# Patient Record
Sex: FEMALE | Race: WHITE | URBAN - METROPOLITAN AREA
[De-identification: names, ages, dates, MRNs, and addresses within clinical notes are randomized per-mention and may not be internally consistent; named-entity substitution may affect disease eponyms.]

---

## 2018-06-05 ENCOUNTER — OFFICE VISIT (OUTPATIENT)
Dept: URBAN - METROPOLITAN AREA CLINIC 40 | Facility: CLINIC | Age: 68
End: 2018-06-05
Payer: MEDICARE

## 2018-06-05 PROCEDURE — 99204 OFFICE O/P NEW MOD 45 MIN: CPT | Performed by: OPHTHALMOLOGY

## 2018-06-05 ASSESSMENT — VISUAL ACUITY
OS: CF 1FT
OD: 20/25

## 2018-06-05 ASSESSMENT — KERATOMETRY
OD: 42.75
OS: 43.50

## 2018-06-05 ASSESSMENT — INTRAOCULAR PRESSURE
OD: 21
OS: 21

## 2018-06-05 NOTE — IMPRESSION/PLAN
Impression: Combined forms of age-related cataract, bilateral: H25.813.  Plan: needs retinal eval first

## 2018-06-05 NOTE — IMPRESSION/PLAN
Impression: Type 2 diab w moderate nonprlf diab rtnop w macular edema, bilateral: O60.0216. Plan: Diabetes type II: moderate background diabetic retinopathy, no signs of neovascularization noted. Will refer patient for a retinal consult to determine if treatment is necessary. Discussed ocular and systemic benefits of maintaining blood sugar control.

## 2018-06-12 ENCOUNTER — OFFICE VISIT (OUTPATIENT)
Dept: URBAN - METROPOLITAN AREA CLINIC 33 | Facility: CLINIC | Age: 68
End: 2018-06-12
Payer: MEDICARE

## 2018-06-12 DIAGNOSIS — H25.813 COMBINED FORMS OF AGE-RELATED CATARACT, BILATERAL: ICD-10-CM

## 2018-06-12 PROCEDURE — 92134 CPTRZ OPH DX IMG PST SGM RTA: CPT | Performed by: OPHTHALMOLOGY

## 2018-06-12 PROCEDURE — 99213 OFFICE O/P EST LOW 20 MIN: CPT | Performed by: OPHTHALMOLOGY

## 2018-06-12 PROCEDURE — 67028 INJECTION EYE DRUG: CPT | Performed by: OPHTHALMOLOGY

## 2018-06-12 ASSESSMENT — INTRAOCULAR PRESSURE
OD: 17
OS: 17

## 2018-06-12 NOTE — IMPRESSION/PLAN
Impression: Type 2 diab w moderate nonprlf diab rtnop w macular edema, bilateral: N24.3164. Recommend FA after series of injection to determine if laser canidate  Plan: OCT ordered and performed today. Discussed diagnosis in detail with patient. Discussed treatment options with patient. Discussed risks and benefits and patient understands. Recommend Avastin OU,  A 3 month series of  Intirivitreal injection's, 1 EVERY MONTH  #1 today then #2 in 1 month, #3 in 2 month's, Then perform DE/OCT  W/ FA in 3 month's.

## 2018-06-12 NOTE — IMPRESSION/PLAN
Impression: Combined forms of age-related cataract, bilateral: H25.813. Plan: No retinal contraindication to CE IOL. Proceed  as lucius BRIDGES/ Dr Buddy Barreto

## 2018-07-25 ENCOUNTER — OFFICE VISIT (OUTPATIENT)
Dept: URBAN - METROPOLITAN AREA CLINIC 23 | Facility: CLINIC | Age: 68
End: 2018-07-25
Payer: MEDICARE

## 2018-07-25 DIAGNOSIS — E11.3292 TYPE 2 DIAB W MILD NONPRLF DIABETIC RTNOP W/O MACULAR EDEMA, LEFT EYE: Primary | ICD-10-CM

## 2018-07-25 PROCEDURE — 92134 CPTRZ OPH DX IMG PST SGM RTA: CPT | Performed by: OPHTHALMOLOGY

## 2018-07-25 PROCEDURE — 92014 COMPRE OPH EXAM EST PT 1/>: CPT | Performed by: OPHTHALMOLOGY

## 2018-07-25 ASSESSMENT — INTRAOCULAR PRESSURE
OS: 14
OD: 15

## 2018-07-25 NOTE — IMPRESSION/PLAN
Impression: Type 2 diab w mild nonprlf diabetic rtnop w macular edema, right eye: F65.0915. OD. Condition: improving.
s/p AV OD #1  06/12/2018 w/Dr José. Plan: Discussed diagnosis in detail with patient. No treatment is required at this time based on exam and OCT. Recommend observation for now. Will reassess condition in 6 wks. OCT shows minimal edema OD.

## 2018-07-25 NOTE — IMPRESSION/PLAN
Impression: Age-related nuclear cataract, bilateral: H25.13. OU. Condition: stable. Plan: Discussed diagnosis in detail with patient. No treatment is required at this time. Will continue to observe condition and or symptoms. Recommend to hold off on a Cataract evaluation until the retina is stable OU.

## 2018-07-25 NOTE — IMPRESSION/PLAN
Impression: Type 2 diab w mild nonprlf diabetic rtnop w/o macular edema, left eye: E50.2369. OS. Condition: unstable but improving. Vision: vision affected. s/p AV OS #1  06/12/2018 w/Dr José Plan: Discussed diagnosis in detail with patient. Discussed risks of progression with present condition. Based on findings recommend Intravitreal Injection Treatment to help reduce the swelling / fluid and prevent a further reduction in vision. Discussed the risks and benefits of tx. All questions answered. Patient elects to proceed with recommendation.  OCT moderate edema w/ERM OS

## 2018-07-25 NOTE — IMPRESSION/PLAN
Impression: Puckering of macula, left eye: H35.372. OS. Condition: stable. Plan: Discussed diagnosis in detail with patient. No treatment is required at this time. Will continue to observe condition and or symptoms. OCT OS shows moderate edema w/ERM OS.

## 2018-08-03 ENCOUNTER — PROCEDURE (OUTPATIENT)
Dept: URBAN - METROPOLITAN AREA CLINIC 23 | Facility: CLINIC | Age: 68
End: 2018-08-03
Payer: MEDICARE

## 2018-08-03 PROCEDURE — 67028 INJECTION EYE DRUG: CPT | Performed by: OPHTHALMOLOGY

## 2018-09-28 ENCOUNTER — OFFICE VISIT (OUTPATIENT)
Dept: URBAN - METROPOLITAN AREA CLINIC 23 | Facility: CLINIC | Age: 68
End: 2018-09-28
Payer: MEDICARE

## 2018-09-28 DIAGNOSIS — H43.813 VITREOUS DEGENERATION, BILATERAL: ICD-10-CM

## 2018-09-28 DIAGNOSIS — E11.3313 TYPE 2 DIAB WITH MODERATE NONP RTNOP WITH MACULAR EDEMA, BI: ICD-10-CM

## 2018-09-28 PROCEDURE — 99213 OFFICE O/P EST LOW 20 MIN: CPT | Performed by: OPHTHALMOLOGY

## 2018-09-28 PROCEDURE — 67028 INJECTION EYE DRUG: CPT | Performed by: OPHTHALMOLOGY

## 2018-09-28 PROCEDURE — 92134 CPTRZ OPH DX IMG PST SGM RTA: CPT | Performed by: OPHTHALMOLOGY

## 2018-09-28 ASSESSMENT — INTRAOCULAR PRESSURE
OS: 18
OD: 19

## 2018-09-28 NOTE — IMPRESSION/PLAN
Impression: Type 2 diab w moderate nonprlf diab rtnop w macular edema, left eye: J91.3421. OS. Condition: improving. Vision: affetced. s/p AV OS #2  08/03/2018 w/Dr Mccormick
s/p AV OS #1  06/12/2018 w/Dr José Plan: Discussed diagnosis in detail with patient. Discussed risks of progression. Based on today's exam, diagnostic studies and review of records, recommend to continue with HUGH tx to help reduce the swelling in order to prevent a further reduction in vision. An examination that was significantly and separately identifiable from the procedure was performed today. Discussed the risks and benefits of tx. Patient elects to proceed with recommendation. OCT shows a decrease in edema w/ERM OS.

## 2018-09-28 NOTE — IMPRESSION/PLAN
Impression: Type 2 diab w mild nonprlf diabetic rtnop w macular edema, right eye: F25.6077. OD. Condition: stable. s/p AV OD #1  06/12/2018 w/Dr José. Plan: Discussed diagnosis in detail with patient. No treatment is required at this time based on exam and OCT. Recommend observation for now. Will reassess condition in 6 wks. OCT shows minimal edema OD.

## 2018-09-28 NOTE — IMPRESSION/PLAN
Impression: Puckering of macula, left eye: H35.372. OS. Condition: stable. Plan: Discussed diagnosis in detail with patient. No treatment is required at this time. Will continue to observe condition and or symptoms. OCT OS shows edema w/ERM OS.

## 2018-11-09 ENCOUNTER — OFFICE VISIT (OUTPATIENT)
Dept: URBAN - METROPOLITAN AREA CLINIC 23 | Facility: CLINIC | Age: 68
End: 2018-11-09
Payer: MEDICARE

## 2018-11-09 DIAGNOSIS — E11.3312 TYPE 2 DIAB W MODERATE NONPRLF DIAB RTNOP W MACULAR EDEMA, LEFT EYE: Primary | ICD-10-CM

## 2018-11-09 PROCEDURE — 92134 CPTRZ OPH DX IMG PST SGM RTA: CPT | Performed by: OPHTHALMOLOGY

## 2018-11-09 PROCEDURE — 99213 OFFICE O/P EST LOW 20 MIN: CPT | Performed by: OPHTHALMOLOGY

## 2018-11-09 ASSESSMENT — INTRAOCULAR PRESSURE
OD: 16
OS: 16

## 2018-11-09 NOTE — IMPRESSION/PLAN
Impression: Type 2 diab w mild nonprlf diabetic rtnop w macular edema, right eye: F97.4109. OD. Condition: stable. s/p AV OD #2  09/28/2018 w/Dr Kimani Fox AV OD #1  06/12/2018 w/Dr José. Plan: Discussed diagnosis in detail with patient. Discussed risks of progression. Based on today's exam, diagnostic studies and review of records, recommend to continue with HUGH tx to help reduce the edema in order to prevent a further reduction in vision. An examination that was significantly and separately identifiable from the procedure was performed today. Discussed the risks and benefits of tx. Patient elects to proceed with recommendation. OCT shows mild edema decreasing OD.

## 2018-11-09 NOTE — IMPRESSION/PLAN
Impression: Type 2 diab w moderate nonprlf diab rtnop w macular edema, left eye: Y39.7708. OS. Condition: improving. Vision: affetced. AV OS #3  09/28/2018, AV OS #2  08/03/2018 w/Dr Mccormick
s/p AV OS #1  06/12/2018 w/Dr José Plan: Discussed diagnosis in detail with patient. Discussed risks of progression. Based on today's exam, diagnostic studies and review of records, recommend to continue with HUGH tx to help reduce the swelling in order to prevent a further reduction in vision. An examination that was significantly and separately identifiable from the procedure was performed today. Discussed the risks and benefits of tx. Patient elects to proceed with recommendation. OCT shows a decrease in edema w/ERM OS.

## 2018-12-07 ENCOUNTER — OFFICE VISIT (OUTPATIENT)
Dept: URBAN - METROPOLITAN AREA CLINIC 23 | Facility: CLINIC | Age: 68
End: 2018-12-07
Payer: MEDICARE

## 2018-12-07 PROCEDURE — 92134 CPTRZ OPH DX IMG PST SGM RTA: CPT | Performed by: OPHTHALMOLOGY

## 2018-12-07 PROCEDURE — 99213 OFFICE O/P EST LOW 20 MIN: CPT | Performed by: OPHTHALMOLOGY

## 2018-12-07 ASSESSMENT — INTRAOCULAR PRESSURE
OD: 16
OS: 17

## 2018-12-07 NOTE — IMPRESSION/PLAN
Impression: Type 2 diab w mild nonprlf diabetic rtnop w macular edema, right eye: V82.3251. OD. Condition: unstable AV OD 11/9/18 w/Dr Maine Mitchell AV OD #1  06/12/2018 w/Dr José. Plan: Discussed diagnosis in detail with patient. Discussed risks of progression. Based on today's exam, diagnostic studies and review of records, recommend to continue with HUGH tx to help reduce the edema in order to prevent a further reduction in vision. An examination that was significantly and separately identifiable from the procedure was performed today. Discussed the risks and benefits of tx. Patient elects to proceed with recommendation. OCT shows mild edema decreasing OD.

## 2018-12-07 NOTE — IMPRESSION/PLAN
Impression: Type 2 diab w moderate nonprlf diab rtnop w macular edema, left eye: C99.3227. OS. Condition: improving. Vision: affetced. AV OS 11/9/18  w/Dr Mccormick
s/p AV OS #1  06/12/2018 w/Dr José Plan: Discussed diagnosis in detail with patient. Discussed risks of progression. Based on today's exam, diagnostic studies and review of records, recommend to continue with HUGH tx to help reduce the swelling in order to prevent a further reduction in vision. An examination that was significantly and separately identifiable from the procedure was performed today. Discussed the risks and benefits of tx. Patient elects to proceed with recommendation. OCT shows a decrease in edema w/ERM OS.

## 2019-01-18 ENCOUNTER — OFFICE VISIT (OUTPATIENT)
Dept: URBAN - METROPOLITAN AREA CLINIC 23 | Facility: CLINIC | Age: 69
End: 2019-01-18
Payer: MEDICARE

## 2019-01-18 PROCEDURE — 92134 CPTRZ OPH DX IMG PST SGM RTA: CPT | Performed by: OPHTHALMOLOGY

## 2019-01-18 PROCEDURE — 92014 COMPRE OPH EXAM EST PT 1/>: CPT | Performed by: OPHTHALMOLOGY

## 2019-01-18 ASSESSMENT — INTRAOCULAR PRESSURE
OD: 15
OS: 15

## 2019-01-18 NOTE — IMPRESSION/PLAN
Impression: Type 2 diab w moderate nonprlf diab rtnop w macular edema, left eye: P82.2562. OS. Condition: improving. Vision: affetced. AV OS 12/07/2018, AV OS 11/9/18  w/Dr Mccormick
s/p AV OS #1  06/12/2018 w/Dr José Plan: Discussed diagnosis in detail with patient. Discussed risks of progression. Based on today's exam, diagnostic studies and review of records, recommend to continue with HUGH tx with KENALOG to help reduce the swelling in order to prevent a further reduction in vision. An examination that was significantly and separately identifiable from the procedure was performed today. Discussed the risks and benefits of tx. Patient elects to proceed with recommendation. OCT shows increase in edema w/ERM OS.

## 2019-01-18 NOTE — IMPRESSION/PLAN
Impression: Type 2 diab w mild nonprlf diabetic rtnop w macular edema, right eye: T68.7898. OD. Condition: unstable AV OD 12/07/18, AV OD 11/9/18 w/Dr Jose Barnes AV OD #1  06/12/2018 w/Dr José. Plan: Discussed diagnosis in detail with patient. Discussed risks of progression. Based on today's exam, diagnostic studies and review of records, recommend to continue with HUGH tx to help reduce the edema in order to prevent a further reduction in vision. An examination that was significantly and separately identifiable from the procedure was performed today. Discussed the risks and benefits of tx. Patient elects to proceed with recommendation. OCT shows increase edema OD.

## 2019-01-22 ENCOUNTER — OFFICE VISIT (OUTPATIENT)
Dept: URBAN - METROPOLITAN AREA CLINIC 23 | Facility: CLINIC | Age: 69
End: 2019-01-22
Payer: MEDICARE

## 2019-01-22 PROCEDURE — 92012 INTRM OPH EXAM EST PATIENT: CPT | Performed by: OPTOMETRIST

## 2019-01-22 PROCEDURE — 92134 CPTRZ OPH DX IMG PST SGM RTA: CPT | Performed by: OPTOMETRIST

## 2019-01-22 ASSESSMENT — INTRAOCULAR PRESSURE
OD: 15
OS: 16

## 2019-01-22 NOTE — IMPRESSION/PLAN
Impression: Type 2 diab w mild nonprlf diabetic rtnop w macular edema, right eye: C14.1044 OD. Plan: Discussed diagnosis in detail with patient. No treatment is required at this time. Reassured patient of current condition and treatment. Will continue to observe condition and or symptoms.

## 2019-03-01 ENCOUNTER — OFFICE VISIT (OUTPATIENT)
Dept: URBAN - METROPOLITAN AREA CLINIC 23 | Facility: CLINIC | Age: 69
End: 2019-03-01
Payer: MEDICARE

## 2019-03-01 PROCEDURE — 99213 OFFICE O/P EST LOW 20 MIN: CPT | Performed by: OPHTHALMOLOGY

## 2019-03-01 PROCEDURE — 92134 CPTRZ OPH DX IMG PST SGM RTA: CPT | Performed by: OPHTHALMOLOGY

## 2019-03-01 ASSESSMENT — INTRAOCULAR PRESSURE
OD: 15
OS: 15

## 2019-03-01 NOTE — IMPRESSION/PLAN
Impression: Type 2 diab w moderate nonprlf diab rtnop w macular edema, left eye: U64.1845. OS. Condition: improving. Vision: affected. Julian OS 01/18/2019, AV OS 12/07/2018, AV OS 11/9/18  w/Dr Mccormick
s/p AV OS #1  06/12/2018 w/Dr José Plan: Discussed diagnosis in detail with patient. Exam OS shows a decrease in edema post KENALOG inj tx. No treatment is required at this time based on exam and OCT. Recommend observation for now. Will reassess condition in 6 - 8 wks. OCT shows a decrease in CMT OS.

## 2019-03-01 NOTE — IMPRESSION/PLAN
Impression: Type 2 diab w mild nonprlf diabetic rtnop w macular edema, right eye: T73.4672. OD. Condition: improving. AV OD 01/18/2019, 12/07/18, AV OD 11/9/18 w/Dr Lin Schumacher AV OD #1  06/12/2018 w/Dr José. Plan: Discussed diagnosis in detail with patient. No treatment is required at this time based on exam and OCT. Recommend observation for now. Will reassess condition in 6 - 8 wks. OCT shows a decrease in CMT OD.

## 2019-04-19 ENCOUNTER — OFFICE VISIT (OUTPATIENT)
Dept: URBAN - METROPOLITAN AREA CLINIC 23 | Facility: CLINIC | Age: 69
End: 2019-04-19
Payer: MEDICARE

## 2019-04-19 PROCEDURE — 92014 COMPRE OPH EXAM EST PT 1/>: CPT | Performed by: OPHTHALMOLOGY

## 2019-04-19 PROCEDURE — 92134 CPTRZ OPH DX IMG PST SGM RTA: CPT | Performed by: OPHTHALMOLOGY

## 2019-04-19 ASSESSMENT — INTRAOCULAR PRESSURE
OS: 16
OD: 15

## 2019-04-19 NOTE — IMPRESSION/PLAN
Impression: Type 2 diab w mild nonprlf diabetic rtnop w macular edema, right eye: E36.9074. OD. Condition: worsening. Vision: affected. AV OD 01/18/2019, 12/07/18, AV OD 11/9/18 w/Dr Maisha Bhatt AV OD #1  06/12/2018 w/Dr José. Plan: Discussed diagnosis in detail with patient. Discussed risks of progression with present condition. Based on findings recommend Intravitreal Injection Treatment with AVASTIN and KENALOG in the right eye to help reduce the swelling and prevent a further reduction in vision. Discussed the risks and benefits of tx. All questions answered. Patient elects to proceed with recommendation. OCT shows an increase in CMT - active edema OD.

## 2019-04-19 NOTE — IMPRESSION/PLAN
Impression: Type 2 diab w moderate nonprlf diab rtnop w macular edema, left eye: S82.9856. OS. Condition: worsening. Vision: affected. Julian OS 01/18/2019, AV OS 12/07/2018, AV OS 11/9/18  w/Dr Mccormick
s/p AV OS #1  06/12/2018 w/Dr José Plan: Discussed diagnosis in detail with patient. Discussed risks of progression with present condition. Based on findings recommend Intravitreal Injection Treatment with AVASTIN and KENALOG in the LEFT EYE to help reduce the swelling and prevent a further reduction in vision. Discussed the risks and benefits of tx. All questions answered. Patient elects to proceed with recommendation. OCT shows an increase in CMT - active edema OS.

## 2019-04-26 ENCOUNTER — PROCEDURE (OUTPATIENT)
Dept: URBAN - METROPOLITAN AREA CLINIC 23 | Facility: CLINIC | Age: 69
End: 2019-04-26
Payer: MEDICARE

## 2019-06-07 ENCOUNTER — OFFICE VISIT (OUTPATIENT)
Dept: URBAN - METROPOLITAN AREA CLINIC 23 | Facility: CLINIC | Age: 69
End: 2019-06-07
Payer: MEDICARE

## 2019-06-07 PROCEDURE — 92134 CPTRZ OPH DX IMG PST SGM RTA: CPT | Performed by: OPHTHALMOLOGY

## 2019-06-07 PROCEDURE — 99213 OFFICE O/P EST LOW 20 MIN: CPT | Performed by: OPHTHALMOLOGY

## 2019-06-07 ASSESSMENT — INTRAOCULAR PRESSURE
OD: 17
OS: 16

## 2019-06-07 NOTE — IMPRESSION/PLAN
Impression: Type 2 diab w moderate nonprlf diab rtnop w macular edema, left eye: C64.5095. OS. Condition: improved Vision: affected. s/p AV & ABILIO OS 04/26/2019, Abilio OS 01/18/2019, AV OS 12/07/2018, AV OS 11/9/18  w/Dr Mccormick
s/p AV OS #1  06/12/2018 w/Dr José Plan: Discussed diagnosis in detail with patient. No treatment is required at this time based on exam and OCT. Recommend observation for now. Will reassess condition in 6 wks.  OCT shows Marked decrease in edema OS

## 2019-06-07 NOTE — IMPRESSION/PLAN
Impression: Type 2 diab w mild nonprlf diabetic rtnop w macular edema, right eye: S12.5151. OD. Condition: improving. Vision: affected. s/p AV  & ABILIO OD 4/26/19, AV OD 01/18/2019, 12/07/18, AV OD 11/9/18 w/Dr Derian German AV OD #1  06/12/2018 w/Dr José. Plan: Discussed diagnosis in detail with patient. No treatment is required at this time based on exam and OCT. Recommend observation for now. Will reassess condition in 6 wks.  OCT shows marked decrease in edema OD

## 2019-07-18 ENCOUNTER — OFFICE VISIT (OUTPATIENT)
Dept: URBAN - METROPOLITAN AREA CLINIC 23 | Facility: CLINIC | Age: 69
End: 2019-07-18
Payer: MEDICARE

## 2019-07-18 PROCEDURE — 92134 CPTRZ OPH DX IMG PST SGM RTA: CPT | Performed by: OPHTHALMOLOGY

## 2019-07-18 PROCEDURE — 99213 OFFICE O/P EST LOW 20 MIN: CPT | Performed by: OPHTHALMOLOGY

## 2019-07-18 ASSESSMENT — INTRAOCULAR PRESSURE
OS: 20
OD: 21

## 2019-07-18 NOTE — IMPRESSION/PLAN
Impression: Type 2 diab w mild nonprlf diabetic rtnop w macular edema, right eye: S44.9928. OD. Condition: stable. Vision: affected. s/p AV  & ABILIO OD 4/26/19, AV OD 01/18/2019, 12/07/18, AV OD 11/9/18 w/Dr Linsey Corbett AV OD #1  06/12/2018 w/Dr José. Plan: Discussed diagnosis in detail with patient. No treatment is required at this time based on exam and OCT. Recommend observation for now. Will reassess condition in 6 - 8 wks. OCT shows a decrease in CMT with no active edema OD. Philip Blend for z-pack, office visit if not better

## 2019-07-18 NOTE — IMPRESSION/PLAN
Impression: Type 2 diab w moderate nonprlf diab rtnop w macular edema, left eye: Z26.1705. OS. Condition: worsening. Vision: affected. s/p AV & ABILIO OS 04/26/2019, Abilio OS 01/18/2019, AV OS 12/07/2018, AV OS 11/9/18  w/Dr Mccormick
s/p AV OS #1  06/12/2018 w/Dr José Plan: Discussed diagnosis in detail with patient. Discussed risks of progression. Based on today's exam, diagnostic studies and review of records, recommend to continue with HUGH tx with AVASTIN & KENALOG in order to help reduce the swelling and prevent a further reduction in vision. Discussed the risks and benefits of tx. Patient elects to proceed with recommendation. OCT shows an increase in edema OS with ERM. Patient may need additional HUHG tx or laser treatment in the future.

## 2019-08-02 ENCOUNTER — PROCEDURE (OUTPATIENT)
Dept: URBAN - METROPOLITAN AREA CLINIC 23 | Facility: CLINIC | Age: 69
End: 2019-08-02
Payer: MEDICARE

## 2019-08-02 PROCEDURE — 67028 INJECTION EYE DRUG: CPT | Performed by: OPHTHALMOLOGY

## 2019-10-10 ENCOUNTER — OFFICE VISIT (OUTPATIENT)
Dept: URBAN - METROPOLITAN AREA CLINIC 23 | Facility: CLINIC | Age: 69
End: 2019-10-10
Payer: MEDICARE

## 2019-10-10 DIAGNOSIS — E11.3211 TYPE 2 DIAB W MILD NONPRLF DIABETIC RTNOP W MACULAR EDEMA, RIGHT EYE: Primary | ICD-10-CM

## 2019-10-10 PROCEDURE — 99213 OFFICE O/P EST LOW 20 MIN: CPT | Performed by: OPHTHALMOLOGY

## 2019-10-10 PROCEDURE — 92134 CPTRZ OPH DX IMG PST SGM RTA: CPT | Performed by: OPHTHALMOLOGY

## 2019-10-10 ASSESSMENT — INTRAOCULAR PRESSURE
OD: 20
OS: 22

## 2019-10-10 NOTE — IMPRESSION/PLAN
Impression: Type 2 diab w moderate nonprlf diab rtnop w macular edema, left eye: T42.5831. OS. Condition: stable OS. Vision: affected. s/p AV / Millicent Duane OS 08/02/2019, AV & ABILIO OS 04/26/2019, Abilio OS 01/18/2019, AV OS 12/07/2018, AV OS 11/9/18  w/Dr Mccormick
s/p AV OS #1  06/12/2018 w/Dr José Plan: Discussed diagnosis in detail with patient. No treatment is required at this time based on exam and OCT. Recommend observation for now. Will reassess condition in 6 wks. OCT shows a decrease in edema OS.

## 2019-10-23 NOTE — IMPRESSION/PLAN
Impression: Type 2 diab w mild nonprlf diabetic rtnop w macular edema, right eye: Z33.7648. OD. Condition: unstable. Vision: affected. s/p AV & ABILIO OD 4/26/19, AV OD 01/18/2019, 12/07/18, AV OD 11/9/18 w/Dr Kerline Dinh AV OD #1  06/12/2018 w/Dr José. Plan: Discussed diagnosis in detail with patient. Discussed risks of progression. Based on today's exam, diagnostic studies and review of records, recommend to restart HUGH tx in order to help reduce the swelling and prevent a further reduction in vision. Discussed the risks and benefits of tx. All questions answered. Patient elects to proceed with recommendation. OCT shows an increase in edema OD. Patient may need additional HUGH tx or laser treatment in the future.

## 2019-11-08 ENCOUNTER — PROCEDURE (OUTPATIENT)
Dept: URBAN - METROPOLITAN AREA CLINIC 23 | Facility: CLINIC | Age: 69
End: 2019-11-08
Payer: MEDICARE

## 2019-11-08 PROCEDURE — 67028 INJECTION EYE DRUG: CPT | Performed by: OPHTHALMOLOGY

## 2019-12-06 ENCOUNTER — OFFICE VISIT (OUTPATIENT)
Dept: URBAN - METROPOLITAN AREA CLINIC 23 | Facility: CLINIC | Age: 69
End: 2019-12-06
Payer: MEDICARE

## 2019-12-06 DIAGNOSIS — H35.372 PUCKERING OF MACULA, LEFT EYE: ICD-10-CM

## 2019-12-06 PROCEDURE — 67028 INJECTION EYE DRUG: CPT | Performed by: OPHTHALMOLOGY

## 2019-12-06 PROCEDURE — 92134 CPTRZ OPH DX IMG PST SGM RTA: CPT | Performed by: OPHTHALMOLOGY

## 2019-12-06 PROCEDURE — 99213 OFFICE O/P EST LOW 20 MIN: CPT | Performed by: OPHTHALMOLOGY

## 2019-12-06 ASSESSMENT — INTRAOCULAR PRESSURE
OD: 18
OS: 19

## 2019-12-06 NOTE — IMPRESSION/PLAN
Impression: Type 2 diab w mild nonprlf diabetic rtnop w macular edema, right eye: B57.1488. OD. Condition: improving. Vision: affected. s/p AV OD 11/8/2019, AV & ABILIO OD 4/26/19, AV OD 01/18/2019, 12/07/18, AV OD 11/9/18 w/Dr Maisha Bhatt AV OD #1  06/12/2018 w/Dr José. Plan: Discussed diagnosis in detail with patient. No treatment is required at this time based on exam and OCT. Recommend observation for now. Will reassess condition in 4-6 wks.  OCT shows decreased edema- stable

## 2019-12-06 NOTE — IMPRESSION/PLAN
Impression: Age-related nuclear cataract, bilateral: H25.13. OU. Condition: worsening. Vision: vision affected. Plan: Advised patient of condition. Discussed diagnosis in detail with patient.  Consult recommended with Cataract Specialist.

## 2019-12-06 NOTE — IMPRESSION/PLAN
Impression: Type 2 diab w moderate nonprlf diab rtnop w macular edema, left eye: C17.1039. OS. Condition: unstable OS. Vision: affected. s/p AV / Sky Cue OS 08/02/2019, AV & ABILIO OS 04/26/2019, Abilio OS 01/18/2019, AV OS 12/07/2018, AV OS 11/9/18  w/Dr Mccormick
s/p AV OS #1  06/12/2018 w/Dr José Plan: Discussed diagnosis in detail with patient. Discussed risks of progression. Based on today's exam, diagnostic studies and review of records, recommend to continue with HUGH tx in order to help reduce the swelling and prevent a further reduction in vision. An examination that was significantly and separately identifiable from the procedure was performed today. Discussed the risks and benefits of tx. Patient elects to proceed with recommendation. OCT shows increased edema Patient may need additional HUGH tx or laser treatment in the future.

## 2020-01-17 ENCOUNTER — OFFICE VISIT (OUTPATIENT)
Dept: URBAN - METROPOLITAN AREA CLINIC 23 | Facility: CLINIC | Age: 70
End: 2020-01-17
Payer: MEDICARE

## 2020-01-17 PROCEDURE — 99213 OFFICE O/P EST LOW 20 MIN: CPT | Performed by: OPHTHALMOLOGY

## 2020-01-17 PROCEDURE — 92134 CPTRZ OPH DX IMG PST SGM RTA: CPT | Performed by: OPHTHALMOLOGY

## 2020-01-17 ASSESSMENT — INTRAOCULAR PRESSURE
OS: 20
OD: 20

## 2020-01-17 NOTE — IMPRESSION/PLAN
Impression: Age-related nuclear cataract, bilateral: H25.13. OU. Vision: vision affected. Plan: Discussed diagnosis in detail with patient. Recommend a Cataract evaluation for consideration of surgery OS >> OD.

## 2020-01-17 NOTE — IMPRESSION/PLAN
Impression: Type 2 diab w mild nonprlf diabetic rtnop w macular edema, right eye: S96.6177. OD. Condition: improving. Vision: affected. s/p AV OD 11/8/2019, AV & ABILIO OD 4/26/19, AV OD 01/18/2019, 12/07/18, AV OD 11/9/18 w/Dr Trish Desai AV OD #1  06/12/2018 w/Dr José. Plan: Discussed diagnosis in detail with patient. No treatment is required at this time based on exam and OCT. Recommend observation for now. Will reassess condition in 8 wks. OCT shows decreased edema OD.

## 2020-01-17 NOTE — IMPRESSION/PLAN
Impression: Type 2 diab w moderate nonprlf diab rtnop w macular edema, left eye: J49.0155. OS. Condition: improving. Vision: affected. s/p AV OS 12/06/19, AV / Keyla  OS 08/02/2019, AV & ABILIO OS 04/26/2019, Abilio OS 01/18/2019, AV OS 12/07/2018, AV OS 11/9/18  w/Dr Mccormick
s/p AV OS #1  06/12/2018 w/Dr José Plan: Discussed diagnosis in detail with patient. Exam OS shows persistent edema however the vision is also blurry due to the Cataract. Recommend to hold off on HUGH tx and proceed with a Cataract evaluation for consideration of surgery OS. OCT shows persistent edema OS with minimal decrease in CMT. Recommend a retina follow - up in 2 mos.

## 2020-02-20 ENCOUNTER — OFFICE VISIT (OUTPATIENT)
Dept: URBAN - METROPOLITAN AREA CLINIC 23 | Facility: CLINIC | Age: 70
End: 2020-02-20
Payer: MEDICARE

## 2020-02-20 DIAGNOSIS — E11.3513 TYPE 2 DIABETES MELLITUS W/ PROLIFERATIVE DIABETIC RETINOPATHY W/ MACULAR EDEMA, BILATERAL: ICD-10-CM

## 2020-02-20 DIAGNOSIS — H25.13 AGE-RELATED NUCLEAR CATARACT, BILATERAL: Primary | ICD-10-CM

## 2020-02-20 PROCEDURE — 92014 COMPRE OPH EXAM EST PT 1/>: CPT | Performed by: OPTOMETRIST

## 2020-02-20 ASSESSMENT — KERATOMETRY
OD: 43.63
OS: 44.38

## 2020-02-20 ASSESSMENT — INTRAOCULAR PRESSURE
OD: 18
OS: 18

## 2020-02-20 ASSESSMENT — VISUAL ACUITY
OD: 20/50
OS: 20/400

## 2020-02-20 NOTE — IMPRESSION/PLAN
Impression: Age-related nuclear cataract, bilateral: H25.13. Plan: Cataracts account for the patient's complaints. Discussed diagnosis in detail with patient. Discussed all R/B's and procedures. Patient understands changing glasses prescription will not significantly improve vision. Patient elects to have surgery, phacoemulsification with intraocular lens recommended. Discussed lens options of Toric/Multifocal. Recommend standard IOL, target distance, RL2. Will refer to cataract surgeon for pre-operative evaluation.

## 2020-02-20 NOTE — IMPRESSION/PLAN
Impression: Type 2 diabetes mellitus w/ proliferative diabetic retinopathy w/ macular edema, bilateral: V99.4432. Plan: Discussed diagnosis in detail with patient. Advised patient of condition. Discussed risks and benefits and patient understands. Emphasized blood sugar control. Discussed risks of progression. Reassured patient of current condition and treatment. Consult recommended with a Retinal Specialists.

## 2020-03-06 ENCOUNTER — OFFICE VISIT (OUTPATIENT)
Dept: URBAN - METROPOLITAN AREA CLINIC 23 | Facility: CLINIC | Age: 70
End: 2020-03-06
Payer: MEDICARE

## 2020-03-06 PROCEDURE — 92014 COMPRE OPH EXAM EST PT 1/>: CPT | Performed by: OPHTHALMOLOGY

## 2020-03-06 ASSESSMENT — KERATOMETRY
OD: 42.88
OS: 43.50

## 2020-03-06 ASSESSMENT — INTRAOCULAR PRESSURE
OD: 18
OS: 18

## 2020-03-06 ASSESSMENT — VISUAL ACUITY
OD: 20/50
OS: 20/400

## 2020-03-06 NOTE — IMPRESSION/PLAN
Impression: Age-related nuclear cataract, bilateral: H25.13. Plan: Cataract accounts for patient's complaints. Reviewed risks, benefits, and procedure. Patient desires surgery, schedule ce/iol OS then OD, RL2, standard lens, no mathew, distance refractive target, patient is clear for surgery in [facility]. Pt to use NSAID and Steroids (Or Combo drops) for 6-8 weeks after surgery. Pt may need trypan blue [id number].

## 2020-03-12 ENCOUNTER — TESTING ONLY (OUTPATIENT)
Dept: URBAN - METROPOLITAN AREA CLINIC 23 | Facility: CLINIC | Age: 70
End: 2020-03-12
Payer: MEDICARE

## 2020-03-12 PROCEDURE — 92136 OPHTHALMIC BIOMETRY: CPT | Performed by: OPHTHALMOLOGY

## 2020-03-12 ASSESSMENT — PACHYMETRY
OS: 24.36
OD: 24.71
OD: 2.77
OS: 2.72

## 2020-03-18 ENCOUNTER — SURGERY (OUTPATIENT)
Dept: URBAN - METROPOLITAN AREA SURGERY 11 | Facility: SURGERY | Age: 70
End: 2020-03-18
Payer: MEDICARE

## 2020-03-18 PROCEDURE — 66982 XCAPSL CTRC RMVL CPLX WO ECP: CPT | Performed by: OPHTHALMOLOGY

## 2020-03-19 ENCOUNTER — POST-OPERATIVE VISIT (OUTPATIENT)
Dept: URBAN - METROPOLITAN AREA CLINIC 23 | Facility: CLINIC | Age: 70
End: 2020-03-19

## 2020-03-19 PROCEDURE — 99024 POSTOP FOLLOW-UP VISIT: CPT | Performed by: OPTOMETRIST

## 2020-03-19 ASSESSMENT — INTRAOCULAR PRESSURE
OD: 16
OS: 16

## 2020-03-25 ENCOUNTER — POST-OPERATIVE VISIT (OUTPATIENT)
Dept: URBAN - METROPOLITAN AREA CLINIC 23 | Facility: CLINIC | Age: 70
End: 2020-03-25

## 2020-03-25 DIAGNOSIS — Z09 ENCNTR FOR F/U EXAM AFT TRTMT FOR COND OTH THAN MALIG NEOPLM: Primary | ICD-10-CM

## 2020-03-25 PROCEDURE — 99024 POSTOP FOLLOW-UP VISIT: CPT | Performed by: OPTOMETRIST

## 2020-03-25 ASSESSMENT — VISUAL ACUITY
OD: 20/80
OS: 20/100

## 2020-03-25 ASSESSMENT — INTRAOCULAR PRESSURE
OD: 16
OS: 16

## 2020-05-05 ENCOUNTER — SURGERY (OUTPATIENT)
Dept: URBAN - METROPOLITAN AREA SURGERY 11 | Facility: SURGERY | Age: 70
End: 2020-05-05
Payer: MEDICARE

## 2020-05-05 PROCEDURE — 66984 XCAPSL CTRC RMVL W/O ECP: CPT | Performed by: OPHTHALMOLOGY

## 2020-05-06 ENCOUNTER — POST-OPERATIVE VISIT (OUTPATIENT)
Dept: URBAN - METROPOLITAN AREA CLINIC 23 | Facility: CLINIC | Age: 70
End: 2020-05-06

## 2020-05-06 PROCEDURE — 99024 POSTOP FOLLOW-UP VISIT: CPT | Performed by: OPTOMETRIST

## 2020-05-06 ASSESSMENT — INTRAOCULAR PRESSURE
OS: 15
OD: 14

## 2020-05-12 ENCOUNTER — POST-OPERATIVE VISIT (OUTPATIENT)
Dept: URBAN - METROPOLITAN AREA CLINIC 23 | Facility: CLINIC | Age: 70
End: 2020-05-12

## 2020-05-12 PROCEDURE — 99024 POSTOP FOLLOW-UP VISIT: CPT | Performed by: OPTOMETRIST

## 2020-05-12 ASSESSMENT — INTRAOCULAR PRESSURE
OD: 15
OS: 14

## 2020-05-12 ASSESSMENT — VISUAL ACUITY: OD: 20/30

## 2020-06-02 ENCOUNTER — POST-OPERATIVE VISIT (OUTPATIENT)
Dept: URBAN - METROPOLITAN AREA CLINIC 23 | Facility: CLINIC | Age: 70
End: 2020-06-02

## 2020-06-02 PROCEDURE — 99024 POSTOP FOLLOW-UP VISIT: CPT | Performed by: OPTOMETRIST

## 2020-06-02 ASSESSMENT — INTRAOCULAR PRESSURE
OD: 20
OS: 19

## 2020-06-08 ENCOUNTER — OFFICE VISIT (OUTPATIENT)
Dept: URBAN - METROPOLITAN AREA CLINIC 22 | Facility: CLINIC | Age: 70
End: 2020-06-08
Payer: COMMERCIAL

## 2020-06-08 DIAGNOSIS — H52.4 PRESBYOPIA: Primary | ICD-10-CM

## 2020-06-08 PROCEDURE — 92015 DETERMINE REFRACTIVE STATE: CPT | Performed by: OPTOMETRIST

## 2020-06-08 PROCEDURE — 92014 COMPRE OPH EXAM EST PT 1/>: CPT | Performed by: OPTOMETRIST

## 2020-06-08 ASSESSMENT — INTRAOCULAR PRESSURE
OS: 17
OD: 16

## 2020-06-08 ASSESSMENT — VISUAL ACUITY
OD: 20/30
OS: 20/80

## 2020-06-08 NOTE — IMPRESSION/PLAN
Impression: Type 2 diab w moderate nonprlf diab rtnop w macular edema, bilateral: N81.5909. OU.  Plan: refer pt back to Dr. Linsey Corbett for continuation of care

## 2021-03-05 ENCOUNTER — OFFICE VISIT (OUTPATIENT)
Dept: URBAN - METROPOLITAN AREA CLINIC 23 | Facility: CLINIC | Age: 71
End: 2021-03-05
Payer: MEDICARE

## 2021-03-05 PROCEDURE — 99214 OFFICE O/P EST MOD 30 MIN: CPT | Performed by: OPHTHALMOLOGY

## 2021-03-05 PROCEDURE — 92134 CPTRZ OPH DX IMG PST SGM RTA: CPT | Performed by: OPHTHALMOLOGY

## 2021-03-05 ASSESSMENT — INTRAOCULAR PRESSURE
OS: 16
OD: 16

## 2021-03-05 NOTE — IMPRESSION/PLAN
Impression: Type 2 diab w moderate nonprlf diab rtnop w macular edema, bilateral: X43.5317. Condition: unstable. Vision: vision affected. s/p AV OS 12/06/19, AV / Jia Ivanoff OS 08/02/2019, AV & JULIAN OS 04/26/2019, Julian OS 01/18/2019, AV OS 12/07/2018, AV OS 11/9/18  w/Dr Mccormick
s/p AV OS #1  06/12/2018 w/Dr José Plan: Discussed diagnosis in detail with patient. Exam OU shows vitreous floaters w/ active diabetic leakage. OCT OD shows a significant increase in edema and OCT OS shows increased edema w/ ERM changes. Discussed risks of progression. Based on today's exam, diagnostic studies and review of records, recommend to restart with HUGH tx AVASTIN BOTH EYES in order to help reduce the swelling and prevent a further reduction in vision. Discussed the risks and benefits of tx. Patient elects to proceed with recommendation. Patient may need additional HUGH tx or laser treatment in the future.

## 2021-03-26 ENCOUNTER — PROCEDURE (OUTPATIENT)
Dept: URBAN - METROPOLITAN AREA CLINIC 23 | Facility: CLINIC | Age: 71
End: 2021-03-26
Payer: MEDICARE

## 2021-05-07 ENCOUNTER — OFFICE VISIT (OUTPATIENT)
Dept: URBAN - METROPOLITAN AREA CLINIC 23 | Facility: CLINIC | Age: 71
End: 2021-05-07
Payer: MEDICARE

## 2021-05-07 PROCEDURE — 92134 CPTRZ OPH DX IMG PST SGM RTA: CPT | Performed by: OPHTHALMOLOGY

## 2021-05-07 ASSESSMENT — INTRAOCULAR PRESSURE
OD: 12
OS: 13

## 2021-05-07 NOTE — IMPRESSION/PLAN
Impression: Type 2 diab w moderate nonprlf diab rtnop w macular edema, bilateral: D81.1594. Condition: improving. Vision: vision affected. s/p  AV OU 03/26/2021, AV OS 12/06/19, AV / Mary Like OS 08/02/2019, AV & ABILIO OS 04/26/2019, Abilio OS 01/18/2019, AV OS 12/07/2018, AV OS 11/9/18  w/Dr Mccormick
s/p AV OS #1  06/12/2018 w/Dr José Plan: Due to Coronavirus COVID-19 pandemic and National Emergency, deferred Slit Lamp examination. Findings are based on OCT and Optos. OCT and Optos shows a decrease in leakage OU. Based on findings recommend to continue with Intravitreal Injection Treatment BOTH EYES with AVASTIN to further reduce the leakage and prevent a further reduction in vision. Discussed the risks and benefits of tx. All questions answered. Patient elects to proceed with recommendation.

## 2021-06-25 ENCOUNTER — OFFICE VISIT (OUTPATIENT)
Dept: URBAN - METROPOLITAN AREA CLINIC 23 | Facility: CLINIC | Age: 71
End: 2021-06-25
Payer: MEDICARE

## 2021-06-25 PROCEDURE — 92134 CPTRZ OPH DX IMG PST SGM RTA: CPT | Performed by: OPHTHALMOLOGY

## 2021-06-25 ASSESSMENT — INTRAOCULAR PRESSURE
OS: 14
OD: 15

## 2021-06-25 NOTE — IMPRESSION/PLAN
Impression: Type 2 diab w moderate nonprlf diab rtnop w macular edema, bilateral: I86.9839. Condition: unstable. Vision: vision affected. s/p AV OU 5/7/2021, AV OU 03/26/2021, AV OS 12/06/19, AV / Lamona Cargo OS 08/02/2019, AV & ABILIO OS 04/26/2019, Abilio OS 01/18/2019, AV OS 12/07/2018, AV OS 11/9/18  w/Dr Mccormick
s/p AV OS #1  06/12/2018 w/Dr José Plan: Due to Coronavirus COVID-19 pandemic and National Emergency, deferred Slit Lamp examination. Findings are based on OCT and Optos. OCT and Optos shows a active edema OU. Based on findings recommend to continue with Intravitreal Injection Treatment BOTH EYES with AVASTIN to further reduce the leakage and prevent a further reduction in vision. Discussed the risks and benefits of tx. All questions answered. Patient elects to proceed with recommendation.

## 2021-08-13 ENCOUNTER — OFFICE VISIT (OUTPATIENT)
Dept: URBAN - METROPOLITAN AREA CLINIC 23 | Facility: CLINIC | Age: 71
End: 2021-08-13
Payer: MEDICARE

## 2021-08-13 PROCEDURE — 92134 CPTRZ OPH DX IMG PST SGM RTA: CPT | Performed by: OPHTHALMOLOGY

## 2021-08-13 PROCEDURE — 99213 OFFICE O/P EST LOW 20 MIN: CPT | Performed by: OPHTHALMOLOGY

## 2021-08-13 ASSESSMENT — INTRAOCULAR PRESSURE
OS: 14
OD: 14

## 2021-08-13 NOTE — IMPRESSION/PLAN
Impression: Type 2 diab w moderate nonprlf diab rtnop w macular edema, bilateral: W13.1300. Condition: unstable. Vision: vision affected. s/p AV OU 5/7/2021, AV OU 03/26/2021, AV OS 12/06/19, AV / Chiara Duane OS 08/02/2019, AV & ABILIO OS 04/26/2019, Abilio OS 01/18/2019, AV OS 12/07/2018, AV OS 11/9/18  w/Dr Mccormick
s/p AV OS #1  06/12/2018 w/Dr José Plan: Due to Coronavirus COVID-19 pandemic and National Emergency, deferred Slit Lamp examination. Findings are based on OCT and Optos. OCT and Optos shows a active persistent edema OU. Based on findings recommend to continue with Intravitreal Injection Treatment BOTH EYES with a steroid medication to further reduce the leakage and prevent a further reduction in vision with both KENALOG and AVASTIN. Discussed the risks and benefits of tx including the possibility of increase in IOP. All questions answered. Patient elects to proceed with recommendation.

## 2021-09-27 ENCOUNTER — OFFICE VISIT (OUTPATIENT)
Dept: URBAN - METROPOLITAN AREA CLINIC 23 | Facility: CLINIC | Age: 71
End: 2021-09-27
Payer: MEDICARE

## 2021-09-27 PROCEDURE — 99214 OFFICE O/P EST MOD 30 MIN: CPT | Performed by: OPHTHALMOLOGY

## 2021-09-27 PROCEDURE — 92134 CPTRZ OPH DX IMG PST SGM RTA: CPT | Performed by: OPHTHALMOLOGY

## 2021-09-27 ASSESSMENT — INTRAOCULAR PRESSURE
OS: 14
OD: 15

## 2021-09-27 NOTE — IMPRESSION/PLAN
Impression: Type 2 diab w moderate nonprlf diab rtnop w macular edema, bilateral: Z49.9311. Condition: unstable. Vision: vision affected. s/p AV / ABILIO OU 8/13/2021, AV OU 5/7/2021, AV OU 03/26/2021, AV OS 12/06/19, AV / Luis Fernando Porteous OS 08/02/2019, AV & ABILIO OS 04/26/2019, Abilio OS 01/18/2019 . .. w/Dr Mccormick
s/p AV OS #1  06/12/2018 w/Dr José Plan: Due to Coronavirus COVID-19 pandemic and National Emergency, deferred Slit Lamp examination. Findings are based on diagnostic tests. Discussed diagnosis in detail with patient. Discussed risks of progression. Recommend Macular Photocoagulation Laser treatment MAP RIGHT EYE first then LEFT EYE to help reduce the swelling and prevent a further reduction in vision. Discussed risks/benefits of laser TX. All questions answered. RL1. OCT and Optos OD shows persistent edema. OCT and Optos OS shows decreasing edema. Patient understands that additional HUGH treatments or laser treatments may be needed in the future.

## 2021-10-26 ENCOUNTER — SURGERY (OUTPATIENT)
Dept: URBAN - METROPOLITAN AREA SURGERY 11 | Facility: SURGERY | Age: 71
End: 2021-10-26
Payer: MEDICARE

## 2021-10-26 PROCEDURE — 67210 TREATMENT OF RETINAL LESION: CPT | Performed by: OPHTHALMOLOGY

## 2021-11-09 ENCOUNTER — SURGERY (OUTPATIENT)
Dept: URBAN - METROPOLITAN AREA SURGERY 11 | Facility: SURGERY | Age: 71
End: 2021-11-09
Payer: MEDICARE

## 2021-11-09 PROCEDURE — 67210 TREATMENT OF RETINAL LESION: CPT | Performed by: OPHTHALMOLOGY

## 2021-12-10 ENCOUNTER — POST-OPERATIVE VISIT (OUTPATIENT)
Dept: URBAN - METROPOLITAN AREA CLINIC 23 | Facility: CLINIC | Age: 71
End: 2021-12-10

## 2021-12-10 DIAGNOSIS — Z48.810 ENCOUNTER FOR SURGICAL AFTERCARE FOLLOWING SURGERY ON A SENSE ORGAN: Primary | ICD-10-CM

## 2021-12-10 PROCEDURE — 99024 POSTOP FOLLOW-UP VISIT: CPT | Performed by: OPHTHALMOLOGY

## 2021-12-10 ASSESSMENT — INTRAOCULAR PRESSURE
OD: 14
OS: 14

## 2021-12-10 NOTE — IMPRESSION/PLAN
Impression: S/P MAP (Photocoagulation for Maculopathy laser) OS - 31 Days. Encounter for surgical aftercare following surgery on a sense organ  Z48.810. Excellent post op course   Post operative instructions reviewed - Condition is improving - Plan: OCT shows a decrease in edema OU. Recommend observation. Will reassess condition in 3 mos. Emphasized blood sugar control and advised to keep future appointments with PCP and/or Endocrinologist for the management of Diabetes.

## 2022-03-18 ENCOUNTER — OFFICE VISIT (OUTPATIENT)
Dept: URBAN - METROPOLITAN AREA CLINIC 23 | Facility: CLINIC | Age: 72
End: 2022-03-18
Payer: MEDICARE

## 2022-03-18 PROCEDURE — 92134 CPTRZ OPH DX IMG PST SGM RTA: CPT | Performed by: OPHTHALMOLOGY

## 2022-03-18 PROCEDURE — 99213 OFFICE O/P EST LOW 20 MIN: CPT | Performed by: OPHTHALMOLOGY

## 2022-03-18 ASSESSMENT — INTRAOCULAR PRESSURE
OS: 14
OD: 14

## 2022-03-18 NOTE — IMPRESSION/PLAN
Impression: Type 2 diab w moderate nonprlf diab rtnop w macular edema, bilateral: T12.0220. Condition: improving. Vision: vision affected. s/p MAP OD 10/26/21, s/p MAP OS 11/09/21

s/p AV / ABILIO OU 8/13/2021, AV OU 5/7/2021, AV OU 03/26/2021, AV OS 12/06/19, AV / Levora Marts OS 08/02/2019, AV & ABILIO OS 04/26/2019, Abilio OS 01/18/2019 . .. w/Dr Mccormick

s/p AV OS #1  06/12/2018 w/Dr José Plan: Discussed diagnosis in detail with patient. Discussed risks of progression. Based on today's exam, diagnostic studies and review of records, recommend to continue with HUGH tx BOTH EYES with AVASTIN in order to help reduce the swelling and prevent a further reduction in vision. Discussed the risks and benefits of tx. All questions answered. Patient elects to proceed with recommendation. OCT and Optos shows decreasing edema OU. Patient may need additional HUGH tx or laser treatment in the future.

## 2022-03-25 ENCOUNTER — PROCEDURE (OUTPATIENT)
Dept: URBAN - METROPOLITAN AREA CLINIC 23 | Facility: CLINIC | Age: 72
End: 2022-03-25
Payer: MEDICARE

## 2022-05-06 ENCOUNTER — OFFICE VISIT (OUTPATIENT)
Dept: URBAN - METROPOLITAN AREA CLINIC 23 | Facility: CLINIC | Age: 72
End: 2022-05-06
Payer: MEDICARE

## 2022-05-06 DIAGNOSIS — E11.3313 TYPE 2 DIAB W MODERATE NONPRLF DIAB RTNOP W MACULAR EDEMA, BILATERAL: Primary | ICD-10-CM

## 2022-05-06 PROCEDURE — 92134 CPTRZ OPH DX IMG PST SGM RTA: CPT | Performed by: OPHTHALMOLOGY

## 2022-05-06 ASSESSMENT — INTRAOCULAR PRESSURE
OD: 18
OS: 18

## 2022-05-06 NOTE — IMPRESSION/PLAN
Impression: Type 2 diab w moderate nonprlf diab rtnop w macular edema, bilateral: Y90.3613. Condition: improving. Vision: vision affected. s/p AV OU 03/25/22, AV/ABILIO OU 08/13/2021, AV OU 05/07/21, AV OU 03/26/21. .. s/p MAP OD 10/26/21, s/p MAP OS 11/09/21 Plan: Due to Coronavirus COVID-19 pandemic and National Emergency, deferred Slit Lamp examination. Findings are based on diagnostic tests. OCT OD shows decrease in swelling and OCT OS shows ILM/ERM change. Optos OU shows decreasing edema. Based on findings recommend to continue with Intravitreal Injection Treatment BOTH EYES with AVASTIN to help reduce the swelling and prevent a further reduction in vision. Discussed the risks and benefits of tx. All questions answered. Patient elects to proceed with recommendation.

## 2022-07-08 ENCOUNTER — OFFICE VISIT (OUTPATIENT)
Dept: URBAN - METROPOLITAN AREA CLINIC 23 | Facility: CLINIC | Age: 72
End: 2022-07-08
Payer: MEDICARE

## 2022-07-08 DIAGNOSIS — E11.3313 TYPE 2 DIAB W MODERATE NONPRLF DIAB RTNOP W MACULAR EDEMA, BILATERAL: Primary | ICD-10-CM

## 2022-07-08 PROCEDURE — 92134 CPTRZ OPH DX IMG PST SGM RTA: CPT | Performed by: OPHTHALMOLOGY

## 2022-07-08 PROCEDURE — 99213 OFFICE O/P EST LOW 20 MIN: CPT | Performed by: OPHTHALMOLOGY

## 2022-07-08 ASSESSMENT — INTRAOCULAR PRESSURE
OS: 20
OD: 20

## 2022-07-08 NOTE — IMPRESSION/PLAN
Impression: Type 2 diab w moderate nonprlf diab rtnop w macular edema, bilateral: X27.6682. Condition: improving. Vision: vision affected. s/p AV OU 05/06/22, AV OU 03/25/22, AV/ABILIO OU 08/13/21, AV OU 05/07/21. .. s/p MAP OD 10/26/21, MAP OS 11/09/21 Plan: Due to Coronavirus COVID-19 pandemic and National Emergency, deferred Slit Lamp examination. Findings are based on diagnostic tests. OCT and Optos OU shows decrease in swelling. Based on diagnostic findings recommend observation for now. Will reassess condition in 3 months. Emphasized blood sugar control and advised to keep future appointments with PCP and/or Endocrinologist for the management of Diabetes. OK to proceed w/ refraction.

## 2022-10-14 ENCOUNTER — OFFICE VISIT (OUTPATIENT)
Dept: URBAN - METROPOLITAN AREA CLINIC 23 | Facility: CLINIC | Age: 72
End: 2022-10-14
Payer: MEDICARE

## 2022-10-14 DIAGNOSIS — E11.3313 TYPE 2 DIABETES MELLITUS WITH MODERATE NONPROLIFERATIVE DIABETIC RETINOPATHY WITH MACULAR EDEMA, BILATERAL: Primary | ICD-10-CM

## 2022-10-14 PROCEDURE — 99213 OFFICE O/P EST LOW 20 MIN: CPT | Performed by: OPHTHALMOLOGY

## 2022-10-14 PROCEDURE — 92134 CPTRZ OPH DX IMG PST SGM RTA: CPT | Performed by: OPHTHALMOLOGY

## 2022-10-14 ASSESSMENT — INTRAOCULAR PRESSURE
OD: 17
OS: 14

## 2022-10-14 NOTE — IMPRESSION/PLAN
Impression: Type 2 diab w moderate nonprlf diab rtnop w macular edema, bilateral: I76.7396. Condition: worsening. Vision: vision affected. s/p AV OU 05/06/22, AV OU 03/25/22, AV/ABILIO OU 08/13/21, AV OU 05/07/21. .. s/p MAP OD 10/26/21, MAP OS 11/09/21 Plan: Discussed diagnosis in detail with patient. Discussed risks of progression. Based on today's exam, diagnostic studies and review of records, recommend to continue with HUGH tx BOTH EYES with AVASTIN in order to help reduce the swelling and prevent a further reduction in vision. Discussed the risks and benefits of tx. All questions answered. Patient elects to proceed with recommendation. OCT and Optos shows increasing edema OU. Patient may need additional HUGH tx or laser treatment in the future.

## 2022-10-21 ENCOUNTER — PROCEDURE (OUTPATIENT)
Dept: URBAN - METROPOLITAN AREA CLINIC 23 | Facility: CLINIC | Age: 72
End: 2022-10-21
Payer: MEDICARE

## 2022-10-21 DIAGNOSIS — E11.3313 TYPE 2 DIABETES MELLITUS WITH MODERATE NONPROLIFERATIVE DIABETIC RETINOPATHY WITH MACULAR EDEMA, BILATERAL: Primary | ICD-10-CM

## 2023-02-03 ENCOUNTER — OFFICE VISIT (OUTPATIENT)
Dept: URBAN - METROPOLITAN AREA CLINIC 23 | Facility: CLINIC | Age: 73
End: 2023-02-03
Payer: MEDICARE

## 2023-02-03 DIAGNOSIS — E11.3313 TYPE 2 DIAB W MODERATE NONPRLF DIAB RTNOP W MACULAR EDEMA, BILATERAL: Primary | ICD-10-CM

## 2023-02-03 PROCEDURE — 92134 CPTRZ OPH DX IMG PST SGM RTA: CPT | Performed by: OPHTHALMOLOGY

## 2023-02-03 PROCEDURE — 99213 OFFICE O/P EST LOW 20 MIN: CPT | Performed by: OPHTHALMOLOGY

## 2023-02-03 ASSESSMENT — INTRAOCULAR PRESSURE
OD: 17
OS: 18

## 2023-02-03 NOTE — IMPRESSION/PLAN
Impression: Type 2 diab w moderate nonprlf diab rtnop w macular edema, bilateral: U46.6480. Condition: improving. Vision: vision stable. s/p AV OU10/21/22, AV OU 05/06/22, AV OU 03/25/22, AV/ABILIO OU 08/13/21. .. s/p MAP OD 10/26/21, MAP OS 11/09/21 Plan: Discussed diagnosis in detail with patient. No treatment is recommended at this time. Emphasized blood sugar control and advised to keep future appointments with PCP and/or Endocrinologist for the management of Diabetes. Recommend observation for now. OCT OU shows a decrease in CMT. Optos OD shows decrease in bleeding/leakage and Optos OS shows stable. Will reassess in 4 months - sooner if any changes.

## 2023-06-23 ENCOUNTER — OFFICE VISIT (OUTPATIENT)
Dept: URBAN - METROPOLITAN AREA CLINIC 23 | Facility: CLINIC | Age: 73
End: 2023-06-23
Payer: MEDICARE

## 2023-06-23 DIAGNOSIS — E11.3313 TYPE 2 DIAB W MODERATE NONPRLF DIAB RTNOP W MACULAR EDEMA, BILATERAL: Primary | ICD-10-CM

## 2023-06-23 PROCEDURE — 99213 OFFICE O/P EST LOW 20 MIN: CPT | Performed by: OPHTHALMOLOGY

## 2023-06-23 PROCEDURE — 92134 CPTRZ OPH DX IMG PST SGM RTA: CPT | Performed by: OPHTHALMOLOGY

## 2023-06-23 ASSESSMENT — INTRAOCULAR PRESSURE
OD: 17
OS: 18

## 2023-06-23 NOTE — IMPRESSION/PLAN
Impression: Type 2 diab w moderate nonprlf diab rtnop w macular edema, bilateral: Z01.8662. Condition: improving. Vision: vision stable. s/p AV OU 10/21/22, AV OU 05/06/22, AV OU 03/25/22, AV/ABILIO OU 08/13/21. .. s/p MAP OD 10/26/21, MAP OS 11/09/21 Plan: Discussed diagnosis in detail with patient. No treatment is recommended at this time. Emphasized blood sugar control and advised to keep future appointments with PCP and/or Endocrinologist for the management of Diabetes. Recommend observation for now. OCT and Optos OD shows no edema. OCT and Optos OS shows minimal edema. Will reassess the retina in 4 - 6 months, sooner if any changes.